# Patient Record
(demographics unavailable — no encounter records)

---

## 2025-01-29 NOTE — HEALTH RISK ASSESSMENT
[Yes] : Yes [2 - 4 times a month (2 pts)] : 2-4 times a month (2 points) [1 or 2 (0 pts)] : 1 or 2 (0 points) [Never (0 pts)] : Never (0 points) [No] : In the past 12 months have you used drugs other than those required for medical reasons? No [Never] : Never [Employed] : employed [0] : 2) Feeling down, depressed, or hopeless: Not at all (0) [PHQ-2 Negative - No further assessment needed] : PHQ-2 Negative - No further assessment needed [None] : None [With Family] : lives with family [] :  [Fully functional (bathing, dressing, toileting, transferring, walking, feeding)] : Fully functional (bathing, dressing, toileting, transferring, walking, feeding) [Fully functional (using the telephone, shopping, preparing meals, housekeeping, doing laundry, using] : Fully functional and needs no help or supervision to perform IADLs (using the telephone, shopping, preparing meals, housekeeping, doing laundry, using transportation, managing medications and managing finances) [Audit-CScore] : 2 [de-identified] : swim, run, weights [de-identified] : balanced [[id number]] : 0 [HIV Test offered] : HIV Test offered [Hepatitis C test offered] : Hepatitis C test offered [Change in mental status noted] : No change in mental status noted [FreeTextEntry2] : self employed

## 2025-01-29 NOTE — REVIEW OF SYSTEMS
[Negative] : Psychiatric [FreeTextEntry4] : canker sores in mouth [de-identified] : blister like sores on hands

## 2025-01-29 NOTE — PLAN
[FreeTextEntry1] : HCM -F/u bloodwork, call patient with results -Up to date with flu vaccine -Discussed with patient the sores in mouth and on hands may be viral illness, can consider coxsackie. Advised patient symptoms may progress to more sores with fevers and myalgias. Encouraged supportive measures, temp control. If symptoms worsen or do not improve, patient advised to return for further eval. Patient verbalizes understanding, agreeable with plan.

## 2025-01-29 NOTE — HISTORY OF PRESENT ILLNESS
[FreeTextEntry1] : CPE [de-identified] : 32 yo male present for CPE. Patient saw cardiology Dr. Sánchez in 2024 for atypical chest discomfort. Patient had stress test and TTE at that time which were WNL. Symptoms resolved.  Patient has  girl, now 1.5 months, also has 3 yo son. Doing well. Patient states for past 3-4 days he has sores in mouth and hands. Appear like blisters. Denies fevers, chills, myalgias. Unsure if on feet. Not bothering him too much, has tried warm water gargles. Otherwise feeling well today.

## 2025-01-29 NOTE — PHYSICAL EXAM
[No Lymphadenopathy] : no lymphadenopathy [Supple] : supple [Thyroid Normal, No Nodules] : the thyroid was normal and there were no nodules present [Coordination Grossly Intact] : coordination grossly intact [No Focal Deficits] : no focal deficits [Normal Gait] : normal gait [Normal] : affect was normal and insight and judgment were intact [de-identified] : +3 apthous ulcers in mouth, blister on palmar aspect of hand and medial left wrist